# Patient Record
Sex: MALE | Race: WHITE | NOT HISPANIC OR LATINO | Employment: FULL TIME | URBAN - METROPOLITAN AREA
[De-identification: names, ages, dates, MRNs, and addresses within clinical notes are randomized per-mention and may not be internally consistent; named-entity substitution may affect disease eponyms.]

---

## 2017-12-01 ENCOUNTER — HISTORICAL (OUTPATIENT)
Dept: RADIOLOGY | Facility: HOSPITAL | Age: 37
End: 2017-12-01

## 2022-07-15 ENCOUNTER — HOSPITAL ENCOUNTER (OUTPATIENT)
Dept: RADIOLOGY | Facility: HOSPITAL | Age: 42
Discharge: HOME | End: 2022-07-15
Attending: INTERNAL MEDICINE
Payer: COMMERCIAL

## 2022-07-15 DIAGNOSIS — M25.572 LEFT ANKLE PAIN: ICD-10-CM

## 2022-07-15 DIAGNOSIS — M79.671 RIGHT FOOT PAIN: ICD-10-CM

## 2022-07-15 DIAGNOSIS — M79.672 LEFT FOOT PAIN: ICD-10-CM

## 2022-07-15 DIAGNOSIS — M25.571 RIGHT ANKLE PAIN: ICD-10-CM

## 2022-07-15 PROCEDURE — 73610 X-RAY EXAM OF ANKLE: CPT | Mod: TC,LT

## 2022-07-15 PROCEDURE — 73610 X-RAY EXAM OF ANKLE: CPT | Mod: TC,RT

## 2022-07-15 PROCEDURE — 73630 X-RAY EXAM OF FOOT: CPT | Mod: TC,LT

## 2022-07-15 PROCEDURE — 73630 X-RAY EXAM OF FOOT: CPT | Mod: TC,RT

## 2022-08-03 DIAGNOSIS — R10.9 STOMACH DISCOMFORT: ICD-10-CM

## 2022-08-03 DIAGNOSIS — R07.9 PAIN IN THE CHEST: Primary | ICD-10-CM

## 2022-08-05 ENCOUNTER — HOSPITAL ENCOUNTER (OUTPATIENT)
Dept: RADIOLOGY | Facility: HOSPITAL | Age: 42
Discharge: HOME | End: 2022-08-05
Attending: INTERNAL MEDICINE
Payer: COMMERCIAL

## 2022-08-05 DIAGNOSIS — R07.9 PAIN IN THE CHEST: ICD-10-CM

## 2022-08-05 DIAGNOSIS — R10.9 STOMACH DISCOMFORT: ICD-10-CM

## 2022-08-05 PROCEDURE — 71260 CT THORAX DX C+: CPT | Mod: TC

## 2022-08-05 PROCEDURE — 25500020 PHARM REV CODE 255: Performed by: INTERNAL MEDICINE

## 2022-08-05 PROCEDURE — 74170 CT ABD WO CNTRST FLWD CNTRST: CPT | Mod: TC

## 2022-08-05 PROCEDURE — 74177 CT ABD & PELVIS W/CONTRAST: CPT | Mod: TC

## 2022-08-05 RX ADMIN — DIATRIZOATE MEGLUMINE AND DIATRIZOATE SODIUM 30 ML: 660; 100 LIQUID ORAL; RECTAL at 08:08

## 2022-08-05 RX ADMIN — IOPAMIDOL 100 ML: 755 INJECTION, SOLUTION INTRAVENOUS at 08:08

## 2023-06-25 ENCOUNTER — HOSPITAL ENCOUNTER (EMERGENCY)
Facility: HOSPITAL | Age: 43
Discharge: HOME | End: 2023-06-25
Attending: STUDENT IN AN ORGANIZED HEALTH CARE EDUCATION/TRAINING PROGRAM
Payer: COMMERCIAL

## 2023-06-25 VITALS
DIASTOLIC BLOOD PRESSURE: 102 MMHG | TEMPERATURE: 97 F | HEART RATE: 54 BPM | SYSTOLIC BLOOD PRESSURE: 149 MMHG | HEIGHT: 71 IN | RESPIRATION RATE: 18 BRPM | BODY MASS INDEX: 33.6 KG/M2 | WEIGHT: 240 LBS | OXYGEN SATURATION: 97 %

## 2023-06-25 DIAGNOSIS — R42 DIZZINESS: ICD-10-CM

## 2023-06-25 DIAGNOSIS — R07.9 CHEST PAIN, UNSPECIFIED TYPE: Primary | ICD-10-CM

## 2023-06-25 DIAGNOSIS — R53.83 FATIGUE, UNSPECIFIED TYPE: ICD-10-CM

## 2023-06-25 DIAGNOSIS — R07.9 CHEST PAIN: ICD-10-CM

## 2023-06-25 LAB
ALBUMIN SERPL-MCNC: 4.2 G/DL (ref 3.5–5)
ALBUMIN/GLOB SERPL: 1.4 RATIO (ref 1.1–2)
ALP SERPL-CCNC: 63 UNIT/L (ref 40–150)
ALT SERPL-CCNC: 22 UNIT/L (ref 0–55)
AST SERPL-CCNC: 18 UNIT/L (ref 5–34)
BASOPHILS # BLD AUTO: 0.07 X10(3)/MCL
BASOPHILS NFR BLD AUTO: 0.9 %
BILIRUBIN DIRECT+TOT PNL SERPL-MCNC: 0.6 MG/DL
BUN SERPL-MCNC: 12.2 MG/DL (ref 8.9–20.6)
CALCIUM SERPL-MCNC: 9.8 MG/DL (ref 8.4–10.2)
CHLORIDE SERPL-SCNC: 106 MMOL/L (ref 98–107)
CO2 SERPL-SCNC: 25 MMOL/L (ref 22–29)
CREAT SERPL-MCNC: 1.2 MG/DL (ref 0.73–1.18)
D DIMER PPP IA.FEU-MCNC: <0.27 UG/ML FEU (ref 0–0.5)
EOSINOPHIL # BLD AUTO: 0.25 X10(3)/MCL (ref 0–0.9)
EOSINOPHIL NFR BLD AUTO: 3.1 %
ERYTHROCYTE [DISTWIDTH] IN BLOOD BY AUTOMATED COUNT: 13 % (ref 11.5–17)
GFR SERPLBLD CREATININE-BSD FMLA CKD-EPI: >60 MLS/MIN/1.73/M2
GLOBULIN SER-MCNC: 3.1 GM/DL (ref 2.4–3.5)
GLUCOSE SERPL-MCNC: 93 MG/DL (ref 74–100)
HCT VFR BLD AUTO: 47.4 % (ref 42–52)
HGB BLD-MCNC: 16.2 G/DL (ref 14–18)
IMM GRANULOCYTES # BLD AUTO: 0.01 X10(3)/MCL (ref 0–0.04)
IMM GRANULOCYTES NFR BLD AUTO: 0.1 %
LYMPHOCYTES # BLD AUTO: 2.09 X10(3)/MCL (ref 0.6–4.6)
LYMPHOCYTES NFR BLD AUTO: 25.6 %
MCH RBC QN AUTO: 27.8 PG (ref 27–31)
MCHC RBC AUTO-ENTMCNC: 34.2 G/DL (ref 33–36)
MCV RBC AUTO: 81.3 FL (ref 80–94)
MONOCYTES # BLD AUTO: 0.43 X10(3)/MCL (ref 0.1–1.3)
MONOCYTES NFR BLD AUTO: 5.3 %
NEUTROPHILS # BLD AUTO: 5.31 X10(3)/MCL (ref 2.1–9.2)
NEUTROPHILS NFR BLD AUTO: 65 %
NRBC BLD AUTO-RTO: 0 %
PLATELET # BLD AUTO: 334 X10(3)/MCL (ref 130–400)
PMV BLD AUTO: 9.2 FL (ref 7.4–10.4)
POCT GLUCOSE: 89 MG/DL (ref 70–110)
POTASSIUM SERPL-SCNC: 4.1 MMOL/L (ref 3.5–5.1)
PROT SERPL-MCNC: 7.3 GM/DL (ref 6.4–8.3)
RBC # BLD AUTO: 5.83 X10(6)/MCL (ref 4.7–6.1)
SODIUM SERPL-SCNC: 139 MMOL/L (ref 136–145)
TROPONIN I SERPL-MCNC: <0.01 NG/ML (ref 0–0.04)
TROPONIN I SERPL-MCNC: <0.01 NG/ML (ref 0–0.04)
WBC # SPEC AUTO: 8.16 X10(3)/MCL (ref 4.5–11.5)

## 2023-06-25 PROCEDURE — 93005 ELECTROCARDIOGRAM TRACING: CPT

## 2023-06-25 PROCEDURE — 63600175 PHARM REV CODE 636 W HCPCS: Performed by: STUDENT IN AN ORGANIZED HEALTH CARE EDUCATION/TRAINING PROGRAM

## 2023-06-25 PROCEDURE — 84484 ASSAY OF TROPONIN QUANT: CPT | Performed by: NURSE PRACTITIONER

## 2023-06-25 PROCEDURE — 96360 HYDRATION IV INFUSION INIT: CPT

## 2023-06-25 PROCEDURE — 82962 GLUCOSE BLOOD TEST: CPT

## 2023-06-25 PROCEDURE — 80053 COMPREHEN METABOLIC PANEL: CPT | Performed by: NURSE PRACTITIONER

## 2023-06-25 PROCEDURE — 99285 EMERGENCY DEPT VISIT HI MDM: CPT | Mod: 25

## 2023-06-25 PROCEDURE — 85379 FIBRIN DEGRADATION QUANT: CPT | Performed by: STUDENT IN AN ORGANIZED HEALTH CARE EDUCATION/TRAINING PROGRAM

## 2023-06-25 PROCEDURE — 84484 ASSAY OF TROPONIN QUANT: CPT | Performed by: STUDENT IN AN ORGANIZED HEALTH CARE EDUCATION/TRAINING PROGRAM

## 2023-06-25 PROCEDURE — 85025 COMPLETE CBC W/AUTO DIFF WBC: CPT | Performed by: NURSE PRACTITIONER

## 2023-06-25 RX ORDER — SODIUM CHLORIDE, SODIUM LACTATE, POTASSIUM CHLORIDE, CALCIUM CHLORIDE 600; 310; 30; 20 MG/100ML; MG/100ML; MG/100ML; MG/100ML
1000 INJECTION, SOLUTION INTRAVENOUS
Status: COMPLETED | OUTPATIENT
Start: 2023-06-25 | End: 2023-06-25

## 2023-06-25 RX ADMIN — SODIUM CHLORIDE, POTASSIUM CHLORIDE, SODIUM LACTATE AND CALCIUM CHLORIDE 1000 ML: 600; 310; 30; 20 INJECTION, SOLUTION INTRAVENOUS at 04:06

## 2023-06-25 NOTE — FIRST PROVIDER EVALUATION
Medical screening examination initiated.  I have conducted a focused provider triage encounter, findings are as follows:    Brief history of present illness:  43y/o M presents to the ED with chest pain with left arm discomfort/jaw pain  Onset 11am    There were no vitals filed for this visit.    Pertinent physical exam:  AAA x 3    Brief workup plan:  Labs, imaging,EKG    Preliminary workup initiated; this workup will be continued and followed by the physician or advanced practice provider that is assigned to the patient when roomed.

## 2023-06-25 NOTE — ED PROVIDER NOTES
Encounter Date: 6/25/2023    SCRIBE #1 NOTE: I, Valeriano Minor, am scribing for, and in the presence of,  Tod Chavira MD. I have scribed the following portions of the note - the EKG reading. Other sections scribed: HPI, ROS, PE.     History     Chief Complaint   Patient presents with    Chest Pain     L sided chest pain radiating to L arm and L jaw onset 1100 while sitting down. First episode lasted 5 minutes, current episode worsening 1200. + Dizziness. + L hand numbness. Took 4 baby asa @ 1100.      43 y/o male presents to the ED with left-sided pain chest that since improved since onset around 11AM today after getting up from the couch. Per pt's wife, he became dizzy and lightheaded and sat on the floor. The pain began to radiate to his left arm. Pt took 4 baby aspirin and soon complained of the pain radiating to his left jaw and left neck, as well as some left hand numbness. Wife also says pt had some left leg pain and tingling en route. Pt currently complains of fatigue, reports chronic pitting edema to bilateral legsand more frequent urinations and bowel movements. Is eating and drinking okay, in no chest discomfort currently. He does note that he had COVID-19 2 weeks ago.    The history is provided by the patient and the spouse. No  was used.   Review of patient's allergies indicates:  No Known Allergies  No past medical history on file.  No past surgical history on file.  No family history on file.     Review of Systems   Constitutional:  Positive for fatigue. Negative for appetite change.   HENT:          Jaw pain   Cardiovascular:  Positive for chest pain and leg swelling.   Genitourinary:  Positive for frequency.   Musculoskeletal:         Left arm pain   Neurological:  Positive for dizziness, light-headedness and numbness (Left hand).     Physical Exam     Initial Vitals [06/25/23 1426]   BP Pulse Resp Temp SpO2   (!) 123/94 64 18 97.2 °F (36.2 °C) 96 %      MAP       --          Physical Exam    Nursing note and vitals reviewed.  Constitutional: He appears well-developed and well-nourished. He is not diaphoretic. No distress.   HENT:   Head: Normocephalic and atraumatic.   Right Ear: External ear normal.   Left Ear: External ear normal.   Nose: Nose normal.   Eyes: EOM are normal. Pupils are equal, round, and reactive to light. Right eye exhibits no discharge. Left eye exhibits no discharge.   Cardiovascular:  Normal rate, regular rhythm and normal heart sounds.     Exam reveals no gallop and no friction rub.       No murmur heard.  Pulmonary/Chest: Effort normal and breath sounds normal. No respiratory distress. He has no wheezes. He has no rhonchi. He has no rales. He exhibits no tenderness.   Abdominal: Abdomen is soft. Bowel sounds are normal. He exhibits no distension and no mass. There is no abdominal tenderness. There is no rebound and no guarding.   Musculoskeletal:         General: Normal range of motion.      Comments: Questionable 1+ pitting edema to bilateral lower extremities     Neurological: He is alert and oriented to person, place, and time. No cranial nerve deficit or sensory deficit.   Skin: Skin is warm and dry. Capillary refill takes less than 2 seconds.       ED Course   Procedures  Labs Reviewed   COMPREHENSIVE METABOLIC PANEL - Abnormal; Notable for the following components:       Result Value    Creatinine 1.20 (*)     All other components within normal limits   TROPONIN I - Normal   D DIMER, QUANTITATIVE - Normal   TROPONIN I - Normal   CBC W/ AUTO DIFFERENTIAL    Narrative:     The following orders were created for panel order CBC Auto Differential.  Procedure                               Abnormality         Status                     ---------                               -----------         ------                     CBC with Differential[[id number]]                            Final result                 Please view results for these tests on the  individual orders.   CBC WITH DIFFERENTIAL   POCT GLUCOSE     EKG Readings: (Independently Interpreted)   Initial Reading: No STEMI. Rhythm: Normal Sinus Rhythm. Heart Rate: 64. ST Segments: Normal ST Segments. Axis: Normal.   Time: 14:24     ECG Results              EKG 12-lead (Final result)  Result time 06/25/23 16:22:10      Final result by Interface, Lab In [facility] (06/25/23 16:22:10)                   Narrative:    Test Reason : R07.9,    Vent. Rate : 064 BPM     Atrial Rate : 064 BPM     P-R Int : 128 ms          QRS Dur : 090 ms      QT Int : 384 ms       P-R-T Axes : 027 070 021 degrees     QTc Int : 396 ms    Normal sinus rhythm  Low voltage QRS  Borderline Abnormal ECG  No previous ECGs available  Confirmed by John Mcadams MD ([id number]) on 6/25/2023 4:22:02 PM    Referred By:             Confirmed By:John Mcadams MD                                  Imaging Results              CT Head Without Contrast (Final result)  Result time 06/25/23 18:53:03      Final result by Georgi Marcus MD (06/25/23 18:53:03)                   Impression:      No acute intracranial findings identified.      Electronically signed by: Georgi Marcus  Date:    06/25/2023  Time:    18:53               Narrative:    EXAMINATION:  CT HEAD WITHOUT CONTRAST    CLINICAL HISTORY:  Mental status change, unknown cause;    TECHNIQUE:  Sequential axial images were performed of the brain without contrast.    Dose product length of 1005 mGycm. Automated exposure control was utilized to minimize radiation dose.    COMPARISON:  December 1, 2017.    FINDINGS:  Gray-white matter differentiation is unremarkable.  There is no intracranial mass effect, midline shift, hydrocephalus or hemorrhage. There is no sulcal effacement or low attenuation changes to suggest recent large vessel territory infarction.  There is no acute extra axial fluid collection.  Left maxillary sinus small retention cyst.  Otherwise, visualized paranasal sinuses are  clear without mucosal thickening, polypoidal abnormality or air-fluid levels. Mastoid air cells aeration is optimal.                                       X-Ray Chest PA And Lateral (Final result)  Result time 06/25/23 14:36:55      Final result by Mick Koch MD (06/25/23 14:36:55)                   Impression:      1. No evidence lobar type consolidation or acute cardiac decompensation noted.      Electronically signed by: Mick Koch MD  Date:    06/25/2023  Time:    14:36               Narrative:    EXAMINATION:  XR CHEST PA AND LATERAL    CLINICAL HISTORY:  Chest pain.    TECHNIQUE:  2 views of the chest    COMPARISON:  None available.    FINDINGS:  The lungs demonstrate no evidence of lobar type consolidation, visible pneumothorax, or pleural fluid.   The cardiac silhouette is within normal limits for size.   No acute displaced fracture is seen.                                       Medications   lactated ringers infusion (0 mLs Intravenous Stopped 6/25/23 1700)              Scribe Attestation:   Scribe #1: I performed the above scribed service and the documentation accurately describes the services I performed. I attest to the accuracy of the note.    Attending Attestation:           Physician Attestation for Scribe:  Physician Attestation Statement for Scribe #1: I, Tod Chavira MD, reviewed documentation, as scribed by Valeriano Minor in my presence, and it is both accurate and complete.       Medical Decision Making  Patient presents with chest pain    The differential diagnosis includes, but is not limited to: Chest pain emergent diagnoses: ACS, PE, dissection, cardiac tamponade, tension pneumothorax.  Chest pain non-emergent diagnoses: Musculoskeletal, trauma, pleurisy, pneumonia, pleural effusion, GERD, other GI, neurologic, psychiatric and other non emergent diagnoses considered.    Patient was awake and alert.  His vitals are stable.  His workup was benign in the emergency department.  Troponin  undetectable, as is his D-dimer.  EKG sinus rhythm no acute findings.  Lab work is largely benign.  I am unsure why he is having symptoms.  Possibly related to recent COVID infection.  I believe that this time he is suitable for discharge home.  Patient and wife in room are comfortable with plan.  I did offer to set him up with Cardiology further evaluation however they declined.  They will follow up with PCP. Return precautions given.  Questions invited, questions answered to the best my ability.  Patient discharged home condition stable.            Amount and/or Complexity of Data Reviewed  External Data Reviewed: notes.     Details: See ED course  Labs: ordered. Decision-making details documented in ED Course.  Radiology: ordered. Decision-making details documented in ED Course.  ECG/medicine tests: ordered and independent interpretation performed. Decision-making details documented in ED Course.           ED Course as of 06/26/23 0134   Stefania Jun 25, 2023   1508 Chart review reveals patient with visit 08/05/2022 where he got a CT with contrast.  CT of the chest with contrast that time was unremarkable for any acute process as was CT of the abdomen pelvis same time. [MM]   1538 Troponin I: <0.010 [MM]   1632 Comprehensive metabolic panel(!)  No significant electrolyte or renal abnormality noted [MM]   1632 CBC Auto Differential  No anemia no leukocytosis [MM]   1632 POCT Glucose: 89 [MM]   1632 X-Ray Chest PA And Lateral  No acute process [MM]   1817 D-Dimer: <0.27 [MM]   1817 Troponin I: <0.010 [MM]   1857 CT Head Without Contrast  Negative for acute [MM]      ED Course User Index  [MM] Tod Chavira MD                 Clinical Impression:   Final diagnoses:  [R07.9] Chest pain  [R07.9] Chest pain, unspecified type (Primary)  [R53.83] Fatigue, unspecified type  [R42] Dizziness        ED Disposition Condition    Discharge Stable          ED Prescriptions    None       Follow-up Information       Follow up With  Specialties Details Why Contact Info    Jimi Hernandez MD Internal Medicine   [address]  [facility]  [facility] [address]  [phone]               Tod Chavira MD  06/26/23 0134

## 2023-06-26 NOTE — DISCHARGE INSTRUCTIONS

## 2024-04-03 ENCOUNTER — HOSPITAL ENCOUNTER (EMERGENCY)
Facility: HOSPITAL | Age: 44
Discharge: HOME | End: 2024-04-03
Attending: EMERGENCY MEDICINE
Payer: COMMERCIAL

## 2024-04-03 VITALS
HEIGHT: 70 IN | OXYGEN SATURATION: 100 % | HEART RATE: 89 BPM | DIASTOLIC BLOOD PRESSURE: 81 MMHG | RESPIRATION RATE: 18 BRPM | BODY MASS INDEX: 32.07 KG/M2 | WEIGHT: 224 LBS | TEMPERATURE: 99 F | SYSTOLIC BLOOD PRESSURE: 122 MMHG

## 2024-04-03 DIAGNOSIS — B34.9 VIRAL SYNDROME: Primary | ICD-10-CM

## 2024-04-03 DIAGNOSIS — R10.13 EPIGASTRIC ABDOMINAL PAIN: ICD-10-CM

## 2024-04-03 LAB
ALBUMIN SERPL-MCNC: 3.9 G/DL (ref 3.5–5)
ALBUMIN/GLOB SERPL: 1.1 RATIO (ref 1.1–2)
ALP SERPL-CCNC: 63 UNIT/L (ref 40–150)
ALT SERPL-CCNC: 27 UNIT/L (ref 0–55)
APPEARANCE UR: CLEAR
AST SERPL-CCNC: 21 UNIT/L (ref 5–34)
BACTERIA #/AREA URNS AUTO: NORMAL /HPF
BASOPHILS # BLD AUTO: 0.04 X10(3)/MCL
BASOPHILS NFR BLD AUTO: 0.4 %
BILIRUB SERPL-MCNC: 0.5 MG/DL
BILIRUB UR QL STRIP.AUTO: NEGATIVE
BNP BLD-MCNC: <10 PG/ML
BUN SERPL-MCNC: 10.9 MG/DL (ref 8.9–20.6)
CALCIUM SERPL-MCNC: 9.8 MG/DL (ref 8.4–10.2)
CHLORIDE SERPL-SCNC: 103 MMOL/L (ref 98–107)
CO2 SERPL-SCNC: 29 MMOL/L (ref 22–29)
COLOR UR AUTO: COLORLESS
CREAT SERPL-MCNC: 1.08 MG/DL (ref 0.73–1.18)
EOSINOPHIL # BLD AUTO: 0.04 X10(3)/MCL (ref 0–0.9)
EOSINOPHIL NFR BLD AUTO: 0.4 %
ERYTHROCYTE [DISTWIDTH] IN BLOOD BY AUTOMATED COUNT: 12.9 % (ref 11.5–17)
FLUAV AG UPPER RESP QL IA.RAPID: NOT DETECTED
FLUBV AG UPPER RESP QL IA.RAPID: NOT DETECTED
GFR SERPLBLD CREATININE-BSD FMLA CKD-EPI: >60 MLS/MIN/1.73/M2
GLOBULIN SER-MCNC: 3.4 GM/DL (ref 2.4–3.5)
GLUCOSE SERPL-MCNC: 96 MG/DL (ref 74–100)
GLUCOSE UR QL STRIP.AUTO: NORMAL
HCT VFR BLD AUTO: 51.3 % (ref 42–52)
HGB BLD-MCNC: 17.5 G/DL (ref 14–18)
IMM GRANULOCYTES # BLD AUTO: 0.03 X10(3)/MCL (ref 0–0.04)
IMM GRANULOCYTES NFR BLD AUTO: 0.3 %
KETONES UR QL STRIP.AUTO: NEGATIVE
LEUKOCYTE ESTERASE UR QL STRIP.AUTO: NEGATIVE
LIPASE SERPL-CCNC: 19 U/L
LYMPHOCYTES # BLD AUTO: 1.42 X10(3)/MCL (ref 0.6–4.6)
LYMPHOCYTES NFR BLD AUTO: 14.9 %
MCH RBC QN AUTO: 28.1 PG (ref 27–31)
MCHC RBC AUTO-ENTMCNC: 34.1 G/DL (ref 33–36)
MCV RBC AUTO: 82.5 FL (ref 80–94)
MONOCYTES # BLD AUTO: 0.89 X10(3)/MCL (ref 0.1–1.3)
MONOCYTES NFR BLD AUTO: 9.3 %
NEUTROPHILS # BLD AUTO: 7.13 X10(3)/MCL (ref 2.1–9.2)
NEUTROPHILS NFR BLD AUTO: 74.7 %
NITRITE UR QL STRIP.AUTO: NEGATIVE
NRBC BLD AUTO-RTO: 0 %
PH UR STRIP.AUTO: 6 [PH]
PLATELET # BLD AUTO: 287 X10(3)/MCL (ref 130–400)
PMV BLD AUTO: 9.3 FL (ref 7.4–10.4)
POTASSIUM SERPL-SCNC: 4.3 MMOL/L (ref 3.5–5.1)
PROT SERPL-MCNC: 7.3 GM/DL (ref 6.4–8.3)
PROT UR QL STRIP.AUTO: NEGATIVE
RBC # BLD AUTO: 6.22 X10(6)/MCL (ref 4.7–6.1)
RBC #/AREA URNS AUTO: NORMAL /HPF
RBC UR QL AUTO: NEGATIVE
SARS-COV-2 RNA RESP QL NAA+PROBE: NOT DETECTED
SODIUM SERPL-SCNC: 139 MMOL/L (ref 136–145)
SP GR UR STRIP.AUTO: 1 (ref 1–1.03)
SQUAMOUS #/AREA URNS LPF: NORMAL /HPF
TROPONIN I SERPL-MCNC: <0.01 NG/ML (ref 0–0.04)
UROBILINOGEN UR STRIP-ACNC: NORMAL
WBC # SPEC AUTO: 9.55 X10(3)/MCL (ref 4.5–11.5)
WBC #/AREA URNS AUTO: NORMAL /HPF

## 2024-04-03 PROCEDURE — 81001 URINALYSIS AUTO W/SCOPE: CPT | Performed by: PHYSICIAN ASSISTANT

## 2024-04-03 PROCEDURE — 93005 ELECTROCARDIOGRAM TRACING: CPT

## 2024-04-03 PROCEDURE — 25500020 PHARM REV CODE 255

## 2024-04-03 PROCEDURE — 0240U COVID/FLU A&B PCR: CPT | Performed by: PHYSICIAN ASSISTANT

## 2024-04-03 PROCEDURE — 85025 COMPLETE CBC W/AUTO DIFF WBC: CPT | Performed by: PHYSICIAN ASSISTANT

## 2024-04-03 PROCEDURE — 83880 ASSAY OF NATRIURETIC PEPTIDE: CPT | Performed by: PHYSICIAN ASSISTANT

## 2024-04-03 PROCEDURE — 63600175 PHARM REV CODE 636 W HCPCS

## 2024-04-03 PROCEDURE — 25000003 PHARM REV CODE 250

## 2024-04-03 PROCEDURE — 84484 ASSAY OF TROPONIN QUANT: CPT | Performed by: PHYSICIAN ASSISTANT

## 2024-04-03 PROCEDURE — 96374 THER/PROPH/DIAG INJ IV PUSH: CPT

## 2024-04-03 PROCEDURE — 80053 COMPREHEN METABOLIC PANEL: CPT | Performed by: PHYSICIAN ASSISTANT

## 2024-04-03 PROCEDURE — 93010 ELECTROCARDIOGRAM REPORT: CPT | Mod: ,,, | Performed by: INTERNAL MEDICINE

## 2024-04-03 PROCEDURE — 83690 ASSAY OF LIPASE: CPT | Performed by: PHYSICIAN ASSISTANT

## 2024-04-03 PROCEDURE — 99285 EMERGENCY DEPT VISIT HI MDM: CPT | Mod: 25

## 2024-04-03 RX ORDER — KETOROLAC TROMETHAMINE 10 MG/1
10 TABLET, FILM COATED ORAL EVERY 6 HOURS
Qty: 20 TABLET | Refills: 0 | Status: SHIPPED | OUTPATIENT
Start: 2024-04-03 | End: 2024-04-08

## 2024-04-03 RX ORDER — DICYCLOMINE HYDROCHLORIDE 20 MG/1
20 TABLET ORAL 2 TIMES DAILY
Qty: 30 TABLET | Refills: 0 | Status: SHIPPED | OUTPATIENT
Start: 2024-04-03 | End: 2024-04-18

## 2024-04-03 RX ORDER — ONDANSETRON 4 MG/1
4 TABLET, ORALLY DISINTEGRATING ORAL EVERY 6 HOURS PRN
Qty: 45 TABLET | Refills: 0 | OUTPATIENT
Start: 2024-04-03 | End: 2024-04-03

## 2024-04-03 RX ORDER — BUTALBITAL, ACETAMINOPHEN AND CAFFEINE 50; 325; 40 MG/1; MG/1; MG/1
1 TABLET ORAL EVERY 6 HOURS PRN
Qty: 45 TABLET | Refills: 0 | OUTPATIENT
Start: 2024-04-03 | End: 2024-04-03

## 2024-04-03 RX ORDER — KETOROLAC TROMETHAMINE 30 MG/ML
30 INJECTION, SOLUTION INTRAMUSCULAR; INTRAVENOUS
Status: COMPLETED | OUTPATIENT
Start: 2024-04-03 | End: 2024-04-03

## 2024-04-03 RX ORDER — BUTALBITAL, ACETAMINOPHEN AND CAFFEINE 50; 325; 40 MG/1; MG/1; MG/1
1 TABLET ORAL EVERY 6 HOURS PRN
Qty: 45 TABLET | Refills: 0 | Status: SHIPPED | OUTPATIENT
Start: 2024-04-03 | End: 2024-05-03

## 2024-04-03 RX ORDER — KETOROLAC TROMETHAMINE 30 MG/ML
30 INJECTION, SOLUTION INTRAMUSCULAR; INTRAVENOUS
Status: DISCONTINUED | OUTPATIENT
Start: 2024-04-03 | End: 2024-04-03

## 2024-04-03 RX ORDER — DICYCLOMINE HYDROCHLORIDE 20 MG/1
20 TABLET ORAL 2 TIMES DAILY
Qty: 30 TABLET | Refills: 0 | OUTPATIENT
Start: 2024-04-03 | End: 2024-04-03

## 2024-04-03 RX ORDER — BUTALBITAL, ACETAMINOPHEN AND CAFFEINE 50; 325; 40 MG/1; MG/1; MG/1
1 TABLET ORAL
Status: COMPLETED | OUTPATIENT
Start: 2024-04-03 | End: 2024-04-03

## 2024-04-03 RX ORDER — ONDANSETRON 4 MG/1
4 TABLET, ORALLY DISINTEGRATING ORAL EVERY 6 HOURS PRN
Qty: 45 TABLET | Refills: 0 | Status: SHIPPED | OUTPATIENT
Start: 2024-04-03

## 2024-04-03 RX ADMIN — KETOROLAC TROMETHAMINE 30 MG: 30 INJECTION, SOLUTION INTRAMUSCULAR at 05:04

## 2024-04-03 RX ADMIN — BUTALBITAL, ACETAMINOPHEN, AND CAFFEINE 1 TABLET: 50; 325; 40 TABLET ORAL at 05:04

## 2024-04-03 RX ADMIN — IOHEXOL 100 ML: 350 INJECTION, SOLUTION INTRAVENOUS at 05:04

## 2024-04-03 NOTE — FIRST PROVIDER EVALUATION
"Medical screening examination initiated.  I have conducted a focused provider triage encounter, findings are as follows:    Chief Complaint   Patient presents with    Fever     Generalized abd pain, diarrhea, nonproductive cough, and a "fever" of "99.6". Denies PMH. Was seen on Sunday for "sinus problems" and started on a Z-carmelina.     Brief history of present illness:  43 y.o. male presents to the ED with epigastric abdominal pain, nausea, and chills/subjective fever onset Sunday. Notes he woke up this morning with low back pain as well as chest pain with deep inspiration. Patient states he just finished a Z-carmelina on Sunday for sinus infection prior to the onset of these symptoms. Denies vomiting. No tylenol/motrin this morning     There were no vitals filed for this visit.    Pertinent physical exam:  Awake, alert, ambulatory, non-labored respirations    Brief workup plan:  labs, UA, EKG    Preliminary workup initiated; this workup will be continued and followed by the physician or advanced practice provider that is assigned to the patient when roomed.  "

## 2024-04-03 NOTE — DISCHARGE INSTRUCTIONS
For stomach pain, take Bentyl 2 times daily.  Additionally you may take Toradol as needed.  Continue taking your current medications.      For nausea, use Zofran as needed.  Recommend that you increase fluid any regular meals as tolerated.      For headaches, Toradol we will help you may also rotate this medication with Fioricet or over-the-counter Tylenol.      Return to ER for worsening symptoms.

## 2024-04-03 NOTE — ED PROVIDER NOTES
"Encounter Date: 4/3/2024       History     Chief Complaint   Patient presents with    Fever     Generalized abd pain that states is constantly a "4" and worsens after meals, diarrhea, nonproductive cough, and a "fever" of "99.6". Denies PMH. Was seen on Sunday for "sinus problems" and started on a Z-carmelina. has been on a Z-carmelina several times since October after being placed on CPAP machine d/t reoccurring sinus infections.      See [facility] for details     The history is provided by the patient.     Review of patient's allergies indicates:   Allergen Reactions    Nuts [tree nut] Anaphylaxis     No past medical history on file.  No past surgical history on file.  No family history on file.     Review of Systems   Constitutional:  Positive for fever. Negative for chills.   HENT:  Positive for congestion.    Respiratory:  Negative for shortness of breath.    Cardiovascular:  Negative for chest pain.   Gastrointestinal:  Positive for abdominal pain and nausea. Negative for vomiting.   All other systems reviewed and are negative.      Physical Exam     Initial Vitals [04/03/24 1222]   BP Pulse Resp Temp SpO2   131/68 99 20 99.4 °F (37.4 °C) 99 %      MAP       --         Physical Exam    Nursing note and vitals reviewed.  Constitutional: He appears well-developed and well-nourished. No distress.   HENT:   Head: Normocephalic and atraumatic.   Eyes: Conjunctivae and EOM are normal.   Neck:   Normal range of motion.  Cardiovascular:  Normal rate, regular rhythm and normal heart sounds.           Pulmonary/Chest: Breath sounds normal. No respiratory distress. He has no wheezes. He exhibits tenderness.   Abdominal: Abdomen is soft.   Tender to palpation to diffuse abdomen. There is no rebound and no guarding.   Musculoskeletal:         General: Normal range of motion.      Cervical back: Normal range of motion.     Neurological: He is alert and oriented to person, place, and time. GCS score is 15. GCS eye subscore is 4. GCS " verbal subscore is 5. GCS motor subscore is 6.   Skin: Skin is warm and dry.   Psychiatric: He has a normal mood and affect. Thought content normal.         ED Course   Procedures  Labs Reviewed   CBC WITH DIFFERENTIAL - Abnormal; Notable for the following components:       Result Value    RBC 6.22 (*)     All other components within normal limits   LIPASE - Normal   TROPONIN I - Normal   URINALYSIS, REFLEX TO URINE CULTURE - Normal   COVID/FLU A&B PCR - Normal    Narrative:     The Xpert Xpress SARS-CoV-2/FLU/RSV plus is a rapid, multiplexed real-time PCR test intended for the simultaneous qualitative detection and differentiation of SARS-CoV-2, Influenza A, Influenza B, and respiratory syncytial virus (RSV) viral RNA in either nasopharyngeal swab or nasal swab specimens.         B-TYPE NATRIURETIC PEPTIDE - Normal   CBC W/ AUTO DIFFERENTIAL    Narrative:     The following orders were created for panel order CBC auto differential.  Procedure                               Abnormality         Status                     ---------                               -----------         ------                     CBC with Differential[[id number]]       Abnormal            Final result                 Please view results for these tests on the individual orders.   COMPREHENSIVE METABOLIC PANEL          Imaging Results              CT Abdomen Pelvis With IV Contrast NO Oral Contrast (Final result)  Result time 04/03/24 17:43:42      Final result by Belkis Arriaza MD (04/03/24 17:43:42)                   Impression:      1. Fluid-filled bowel loops are nonspecific but can be seen with enteritis.      Electronically signed by: Belkis Arriaza  Date:    04/03/2024  Time:    17:43               Narrative:    EXAMINATION:  CT ABDOMEN PELVIS WITH IV CONTRAST    CLINICAL HISTORY:  Abdominal pain, acute, nonlocalized;    TECHNIQUE:  Helically acquired images with axial, sagittal and coronal reformations were obtained from the  lung bases to the pubic symphysis after the IV administration of contrast.    Automated tube current modulation, weight-based exposure dosing, and/or iterative reconstruction technique utilized to reach lowest reasonably achievable exposure rate.    DLP: 933 mGy*cm    COMPARISON:  Abdominal sonogram 04/03/2024    FINDINGS:  HEART: Normal in size. No pericardial effusion.    LUNG BASES: Well aerated.    LIVER: Normal attenuation. No appreciable focal hepatic lesion.    BILIARY: No calcified gallstones.    PANCREAS: No inflammatory change.    SPLEEN: Normal in size    ADRENALS: No mass.    KIDNEYS/URETERS: The kidneys enhance symmetrically.  No hydronephrosis.    GI TRACT/MESENTERY: Small sliding hiatal hernia. Fluid-filled small bowel loops without evidence of obstruction.  The appendix is normal.    PERITONEUM: No free fluid.No free air.    LYMPH NODES: No enlarged lymph nodes by size criteria.    VASCULATURE: No significant atherosclerosis or aneurysm.    BLADDER: Normal appearance given degree of distention.    REPRODUCTIVE ORGANS: Normal as visualized.    SOFT TISSUES: Unremarkable.    BONES: No acute osseous abnormality.                                       US Abdomen Limited (Final result)  Result time 04/03/24 13:43:54      Final result by Belkis Arriaza MD (04/03/24 13:43:54)                   Impression:      No significant abnormality.      Electronically signed by: Belkis Arriaza  Date:    04/03/2024  Time:    13:43               Narrative:    EXAMINATION:  US ABDOMEN LIMITED    CLINICAL HISTORY:  RUQ pain;    TECHNIQUE:  Limited ultrasound of the right upper quadrant of the abdomen was performed.    COMPARISON:  CT abdomen pelvis 08/05/2022    FINDINGS:  LIVER: 13 cm cranial caudal at the midclavicular line. Normal echotexture. No focal mass appreciable. Portal vein is patent with appropriate directional flow.    PANCREAS: Head of the pancreas appears normal. Tail is obscured by bowel  gas.    GALLBLADDER: No shadowing calculi, wall thickening, or pericholecystic fluid.  Negative sonographic Mishra sign.    BILE DUCTS: 3 mm common bile duct.  Distal common bile duct is obscured by shadowing bowel gas.    INFERIOR VENA CAVA: Normal in appearance.    RIGHT KIDNEY: 9.9 cm. No hydronephrosis.    OTHER: No ascites.                                       Medications   ketorolac injection 30 mg (30 mg Intravenous Given 4/3/24 1722)   iohexoL (OMNIPAQUE 350) injection 100 mL (100 mLs Intravenous Given 4/3/24 1740)   butalbital-acetaminophen-caffeine -40 mg per tablet 1 tablet (1 tablet Oral Given 4/3/24 1757)     Medical Decision Making  43-year-old male presents to the ER for evaluation of generalized abdominal pain x4 days.  Patient reports that over the last 4 days he has been having worsening Diffuse abdominal pain.  He reports worsening pain following eating.  He reports his pain is constant, however, he does have episodes of intense pain.  He denies any relieving factors to his pain.  He reports that he has been taking Prilosec at home without relief.  Patient reports that he has had some nausea without vomiting.  He denies taking any medications for this at home.  He reports that he is continued to tolerate liquid and solid foods.  The patient is also reports a low-grade fever over the last 4-5 days.  He reports that T-max was 99.6.  Patient reports that he has been having an ongoing issue with chronic sinusitis/congestion since October of 2023.  He has been seeing his primary care provider who has been giving him Z-David eats every few weeks.  He shares that his congestion and sinusitis symptoms will improve, however, they always return.  He reports that he is been using a CPAP recently which does not seem to be helping.  He also reports some pain with taking a deep breath.  The patient is also complaining of a diffuse headache.  He reports that the headache is sensitive to light and sound.  He  denies any vision changes.  Patient denies any known sick contacts.    Lab work today was unremarkable.  Ultrasound of the right upper quadrant was ordered which showed no abnormalities.  Discussed this with the patient and he verbalizes understanding.  On physical exam, the patient did have diffuse tenderness which prompted a CT scan.  The CT scan showed possible enteritis.  Discussed this with the patient and he verbalizes understanding.  Suspect that all of his symptoms are secondary to some inflammatory response.  IM Toradol was given in ED without relief of headache pain.  He was then given Fioricet which did help some.  Recommend that he continues to see his primary care provider for continued management of his chronic congestion.  He does appear to have some costochondritis today for which anti-inflammatories we will help.  Discussed all this with the patient he verbalizes understanding.  We will discharge home with Toradol, Zofran, Bentyl, Zofran for symptomatic relief.  Patient was in agreement with treatment plan and was discharged home.    Amount and/or Complexity of Data Reviewed  Labs:  Decision-making details documented in ED Course.  Radiology: ordered.    Risk  Prescription drug management.      Additional MDM:   Differential Diagnosis:   Other: The following diagnoses were also considered and will be evaluated: MI, costochondritis and Cholecystitis.            ED Course as of 04/03/24 2122 Wed Apr 03, 2024   1641 CBC grossly unremarkable [LM]   1647 CMP unremarkable [LM]   1648 UA negative [LM]   1648 BNP: <10.0 [LM]   1648 Troponin I: <0.010 [LM]   1648 Lipase: 19 [LM]   1648 COVID, flu negative [LM]   1648 Ultrasound Limited:Impression:     No significant abnormality.      [LM]   1746 CT Abd/ Pelvis: Impression:     1. Fluid-filled bowel loops are nonspecific but can be seen with enteritis.        Electronically signed by: Belkis Arriaza   [LM]      ED Course User Index  [LM] Yris Maier PA                            Clinical Impression:  Final diagnoses:  [R10.13] Epigastric abdominal pain  [B34.9] Viral syndrome (Primary)          ED Disposition Condition    Discharge Stable          ED Prescriptions       Medication Sig Dispense Start Date End Date Auth. Provider    butalbital-acetaminophen-caffeine -40 mg (FIORICET, ESGIC) -40 mg per tablet  (Status: Discontinued) Take 1 tablet by mouth every 6 (six) hours as needed for Headaches. 45 tablet 4/3/2024 4/3/2024 Yris Maier PA    ondansetron (ZOFRAN-ODT) 4 MG TbDL  (Status: Discontinued) Take 1 tablet (4 mg total) by mouth every 6 (six) hours as needed (nausea). 45 tablet 4/3/2024 4/3/2024 Yris Maier PA    dicyclomine (BENTYL) 20 mg tablet  (Status: Discontinued) Take 1 tablet (20 mg total) by mouth 2 (two) times daily. for 15 days 30 tablet 4/3/2024 4/3/2024 Yris Maier PA    butalbital-acetaminophen-caffeine -40 mg (FIORICET, ESGIC) -40 mg per tablet Take 1 tablet by mouth every 6 (six) hours as needed for Headaches. 45 tablet 4/3/2024 5/3/2024 Yris Maier PA    dicyclomine (BENTYL) 20 mg tablet Take 1 tablet (20 mg total) by mouth 2 (two) times daily. for 15 days 30 tablet 4/3/2024 4/18/2024 Yris Maier PA    ondansetron (ZOFRAN-ODT) 4 MG TbDL Take 1 tablet (4 mg total) by mouth every 6 (six) hours as needed (nausea). 45 tablet 4/3/2024 -- Yris Maier PA    ketorolac (TORADOL) 10 mg tablet Take 1 tablet (10 mg total) by mouth every 6 (six) hours. for 5 days 20 tablet 4/3/2024 4/8/2024 Yris Maier PA          Follow-up Information       Follow up With Specialties Details Why Contact Info    Jimi Hernandez MD Internal Medicine Schedule an appointment as soon as possible for a visit in 1 day for continued management of chronic congestion [address] [place] [address] [facility] [address]  [phone]               Yris Maier PA  04/03/24 7457

## 2024-04-04 LAB
OHS QRS DURATION: 90 MS
OHS QTC CALCULATION: 428 MS

## 2025-07-12 ENCOUNTER — HOSPITAL ENCOUNTER (EMERGENCY)
Facility: HOSPITAL | Age: 45
Discharge: HOME | End: 2025-07-12
Attending: EMERGENCY MEDICINE
Payer: COMMERCIAL

## 2025-07-12 VITALS
HEIGHT: 70 IN | SYSTOLIC BLOOD PRESSURE: 139 MMHG | RESPIRATION RATE: 15 BRPM | HEART RATE: 63 BPM | OXYGEN SATURATION: 96 % | WEIGHT: 220 LBS | TEMPERATURE: 98 F | BODY MASS INDEX: 31.5 KG/M2 | DIASTOLIC BLOOD PRESSURE: 89 MMHG

## 2025-07-12 DIAGNOSIS — R10.31 RIGHT GROIN PAIN: ICD-10-CM

## 2025-07-12 DIAGNOSIS — N50.811 PAIN IN RIGHT TESTICLE: ICD-10-CM

## 2025-07-12 DIAGNOSIS — R10.9 RIGHT SIDED ABDOMINAL PAIN: Primary | ICD-10-CM

## 2025-07-12 LAB
ALBUMIN SERPL-MCNC: 4.4 G/DL (ref 3.5–5)
ALBUMIN/GLOB SERPL: 1.5 RATIO (ref 1.1–2)
ALP SERPL-CCNC: 58 UNIT/L (ref 40–150)
ALT SERPL-CCNC: 20 UNIT/L (ref 0–55)
ANION GAP SERPL CALC-SCNC: 8 MEQ/L
AST SERPL-CCNC: 16 UNIT/L (ref 11–45)
BACTERIA #/AREA URNS AUTO: NORMAL /HPF
BASOPHILS # BLD AUTO: 0.07 X10(3)/MCL
BASOPHILS NFR BLD AUTO: 0.9 %
BILIRUB SERPL-MCNC: 1 MG/DL
BILIRUB UR QL STRIP.AUTO: NEGATIVE
BUN SERPL-MCNC: 11.7 MG/DL (ref 8.9–20.6)
CALCIUM SERPL-MCNC: 9.7 MG/DL (ref 8.4–10.2)
CHLORIDE SERPL-SCNC: 105 MMOL/L (ref 98–107)
CLARITY UR: CLEAR
CO2 SERPL-SCNC: 28 MMOL/L (ref 22–29)
COLOR UR AUTO: NORMAL
CREAT SERPL-MCNC: 1.06 MG/DL (ref 0.72–1.25)
CREAT/UREA NIT SERPL: 11
EOSINOPHIL # BLD AUTO: 0.12 X10(3)/MCL (ref 0–0.9)
EOSINOPHIL NFR BLD AUTO: 1.6 %
ERYTHROCYTE [DISTWIDTH] IN BLOOD BY AUTOMATED COUNT: 13 % (ref 11.5–17)
FLUAV AG UPPER RESP QL IA.RAPID: NOT DETECTED
FLUBV AG UPPER RESP QL IA.RAPID: NOT DETECTED
GFR SERPLBLD CREATININE-BSD FMLA CKD-EPI: >60 ML/MIN/1.73/M2
GLOBULIN SER-MCNC: 2.9 GM/DL (ref 2.4–3.5)
GLUCOSE SERPL-MCNC: 68 MG/DL (ref 74–100)
GLUCOSE UR QL STRIP: NEGATIVE
HCT VFR BLD AUTO: 48.8 % (ref 42–52)
HGB BLD-MCNC: 16.2 G/DL (ref 14–18)
HGB UR QL STRIP: NEGATIVE
IMM GRANULOCYTES # BLD AUTO: 0.01 X10(3)/MCL (ref 0–0.04)
IMM GRANULOCYTES NFR BLD AUTO: 0.1 %
KETONES UR QL STRIP: NEGATIVE
LEUKOCYTE ESTERASE UR QL STRIP: NEGATIVE
LYMPHOCYTES # BLD AUTO: 2.16 X10(3)/MCL (ref 0.6–4.6)
LYMPHOCYTES NFR BLD AUTO: 29.1 %
MAGNESIUM SERPL-MCNC: 2.3 MG/DL (ref 1.6–2.6)
MCH RBC QN AUTO: 27.8 PG (ref 27–31)
MCHC RBC AUTO-ENTMCNC: 33.2 G/DL (ref 33–36)
MCV RBC AUTO: 83.7 FL (ref 80–94)
MONOCYTES # BLD AUTO: 0.48 X10(3)/MCL (ref 0.1–1.3)
MONOCYTES NFR BLD AUTO: 6.5 %
NEUTROPHILS # BLD AUTO: 4.57 X10(3)/MCL (ref 2.1–9.2)
NEUTROPHILS NFR BLD AUTO: 61.8 %
NITRITE UR QL STRIP: NEGATIVE
NRBC BLD AUTO-RTO: 0 %
PH UR STRIP: 6 [PH]
PLATELET # BLD AUTO: 284 X10(3)/MCL (ref 130–400)
PMV BLD AUTO: 9.5 FL (ref 7.4–10.4)
POTASSIUM SERPL-SCNC: 4.1 MMOL/L (ref 3.5–5.1)
PROT SERPL-MCNC: 7.3 GM/DL (ref 6.4–8.3)
PROT UR QL STRIP: NEGATIVE
RBC # BLD AUTO: 5.83 X10(6)/MCL (ref 4.7–6.1)
RBC #/AREA URNS AUTO: NORMAL /HPF
RSV A 5' UTR RNA NPH QL NAA+PROBE: NOT DETECTED
SARS-COV-2 RNA RESP QL NAA+PROBE: NOT DETECTED
SODIUM SERPL-SCNC: 141 MMOL/L (ref 136–145)
SP GR UR STRIP.AUTO: <=1.005 (ref 1–1.03)
SQUAMOUS #/AREA URNS LPF: NORMAL /HPF
UROBILINOGEN UR STRIP-ACNC: 0.2
WBC # BLD AUTO: 7.41 X10(3)/MCL (ref 4.5–11.5)
WBC #/AREA URNS AUTO: NORMAL /HPF

## 2025-07-12 PROCEDURE — 25000003 PHARM REV CODE 250: Performed by: PHYSICIAN ASSISTANT

## 2025-07-12 PROCEDURE — 81001 URINALYSIS AUTO W/SCOPE: CPT | Performed by: PHYSICIAN ASSISTANT

## 2025-07-12 PROCEDURE — 83735 ASSAY OF MAGNESIUM: CPT | Performed by: PHYSICIAN ASSISTANT

## 2025-07-12 PROCEDURE — 63600175 PHARM REV CODE 636 W HCPCS: Performed by: PHYSICIAN ASSISTANT

## 2025-07-12 PROCEDURE — 96375 TX/PRO/DX INJ NEW DRUG ADDON: CPT

## 2025-07-12 PROCEDURE — 80053 COMPREHEN METABOLIC PANEL: CPT | Performed by: PHYSICIAN ASSISTANT

## 2025-07-12 PROCEDURE — 96374 THER/PROPH/DIAG INJ IV PUSH: CPT

## 2025-07-12 PROCEDURE — 96361 HYDRATE IV INFUSION ADD-ON: CPT

## 2025-07-12 PROCEDURE — 99285 EMERGENCY DEPT VISIT HI MDM: CPT | Mod: 25

## 2025-07-12 PROCEDURE — 87637 SARSCOV2&INF A&B&RSV AMP PRB: CPT | Performed by: PHYSICIAN ASSISTANT

## 2025-07-12 PROCEDURE — 85025 COMPLETE CBC W/AUTO DIFF WBC: CPT | Performed by: PHYSICIAN ASSISTANT

## 2025-07-12 RX ORDER — MORPHINE SULFATE 4 MG/ML
4 INJECTION, SOLUTION INTRAMUSCULAR; INTRAVENOUS
Refills: 0 | Status: COMPLETED | OUTPATIENT
Start: 2025-07-12 | End: 2025-07-12

## 2025-07-12 RX ORDER — HYDROCODONE BITARTRATE AND ACETAMINOPHEN 5; 325 MG/1; MG/1
1 TABLET ORAL EVERY 8 HOURS PRN
Qty: 9 TABLET | Refills: 0 | Status: SHIPPED | OUTPATIENT
Start: 2025-07-12 | End: 2025-07-15

## 2025-07-12 RX ORDER — ONDANSETRON HYDROCHLORIDE 2 MG/ML
4 INJECTION, SOLUTION INTRAVENOUS
Status: COMPLETED | OUTPATIENT
Start: 2025-07-12 | End: 2025-07-12

## 2025-07-12 RX ORDER — IBUPROFEN 800 MG/1
800 TABLET, FILM COATED ORAL 3 TIMES DAILY PRN
Qty: 21 TABLET | Refills: 0 | Status: SHIPPED | OUTPATIENT
Start: 2025-07-12 | End: 2025-07-19

## 2025-07-12 RX ADMIN — SODIUM CHLORIDE 1000 ML: 9 INJECTION, SOLUTION INTRAVENOUS at 06:07

## 2025-07-12 RX ADMIN — ONDANSETRON 4 MG: 2 INJECTION INTRAMUSCULAR; INTRAVENOUS at 06:07

## 2025-07-12 RX ADMIN — MORPHINE SULFATE 4 MG: 4 INJECTION INTRAVENOUS at 06:07

## 2025-07-12 NOTE — FIRST PROVIDER EVALUATION
Medical screening examination initiated.  I have conducted a focused provider triage encounter, findings are as follows:    Brief history of present illness:  44yoWM presents to the ER with RLQ pain that radiates to low back and into testicles x 4 days. Hx of testicular torsion. No vomiting.     There were no vitals filed for this visit.    Pertinent physical exam:  looks uncomfortable.     Brief workup plan:  labs and imaging.     Preliminary workup initiated; this workup will be continued and followed by the physician or advanced practice provider that is assigned to the patient when roomed.

## 2025-07-13 NOTE — DISCHARGE INSTRUCTIONS
Hydrate. Ibuprofen for pain/inflammation, take with food. If pain is severe may take norco for more severe pain, do not take and drive. Follow up with primary care and/or urology.

## 2025-07-13 NOTE — ED PROVIDER NOTES
Encounter Date: 7/12/2025       History     Chief Complaint   Patient presents with    Abdominal Pain     Abd pain extending to flank and groin, started wed this week     See MDM    The history is provided by the patient. No  was used.     Review of patient's allergies indicates:   Allergen Reactions    Nuts [tree nut] Anaphylaxis     No past medical history on file.  No past surgical history on file.  No family history on file.  Social History[1]  Review of Systems   Gastrointestinal:  Positive for abdominal pain.   Genitourinary:  Positive for flank pain and testicular pain. Negative for difficulty urinating.   All other systems reviewed and are negative.      Physical Exam     Initial Vitals [07/12/25 1710]   BP Pulse Resp Temp SpO2   (!) 175/76 63 15 97.6 °F (36.4 °C) 98 %      MAP       --         Physical Exam    Nursing note and vitals reviewed.  Constitutional: He appears well-developed.   Cardiovascular:  Normal rate, regular rhythm and normal heart sounds.           Pulmonary/Chest: Breath sounds normal. No respiratory distress.   Abdominal: Abdomen is soft. He exhibits no distension. There is abdominal tenderness in the right upper quadrant and right lower quadrant.   Right flank pain; right groin   Genitourinary:    Genitourinary Comments: Exam deferred at this time -- denies swelling, redness to testicle        Neurological: He is alert and oriented to person, place, and time.   Skin: Skin is warm and dry.   Psychiatric: He has a normal mood and affect.         ED Course   Procedures  Labs Reviewed   COMPREHENSIVE METABOLIC PANEL - Abnormal       Result Value    Sodium 141      Potassium 4.1      Chloride 105      CO2 28      Glucose 68 (*)     Blood Urea Nitrogen 11.7      Creatinine 1.06      Calcium 9.7      Protein Total 7.3      Albumin 4.4      Globulin 2.9      Albumin/Globulin Ratio 1.5      Bilirubin Total 1.0      ALP 58      ALT 20      AST 16      eGFR >60      Anion Gap  8.0      BUN/Creatinine Ratio 11     MAGNESIUM - Normal    Magnesium Level 2.30     URINALYSIS, REFLEX TO URINE CULTURE - Normal    Color, UA Light-Yellow      Appearance, UA Clear      Specific Gravity, UA <=1.005      pH, UA 6.0      Protein, UA Negative      Glucose, UA Negative      Ketones, UA Negative      Blood, UA Negative      Bilirubin, UA Negative      Urobilinogen, UA 0.2      Nitrites, UA Negative      Leukocyte Esterase, UA Negative      RBC, UA 0-5      WBC, UA None Seen      Bacteria, UA None Seen      Squamous Epithelial Cells, UA None Seen     COVID/RSV/FLU A&B PCR - Normal    Influenza A PCR Not Detected      Influenza B PCR Not Detected      Respiratory Syncytial Virus PCR Not Detected      SARS-CoV-2 PCR Not Detected      Narrative:     The Xpert Xpress SARS-CoV-2/FLU/RSV plus is a rapid, multiplexed real-time PCR test intended for the simultaneous qualitative detection and differentiation of SARS-CoV-2, Influenza A, Influenza B, and respiratory syncytial virus (RSV) viral RNA in either nasopharyngeal swab or nasal swab specimens.         CBC W/ AUTO DIFFERENTIAL    Narrative:     The following orders were created for panel order CBC auto differential.  Procedure                               Abnormality         Status                     ---------                               -----------         ------                     CBC with Differential[[id number]]                           Final result                 Please view results for these tests on the individual orders.   CBC WITH DIFFERENTIAL    WBC 7.41      RBC 5.83      Hgb 16.2      Hct 48.8      MCV 83.7      MCH 27.8      MCHC 33.2      RDW 13.0      Platelet 284      MPV 9.5      Neut % 61.8      Lymph % 29.1      Mono % 6.5      Eos % 1.6      Basophil % 0.9      Imm Grans % 0.1      Neut # 4.57      Lymph # 2.16      Mono # 0.48      Eos # 0.12      Baso # 0.07      Imm Gran # 0.01      NRBC% 0.0            Imaging Results               CT Renal Stone Study ABD Pelvis WO (Final result)  Result time 07/12/25 19:23:05      Final result by Georgi Marcus MD (07/12/25 19:23:05)                   Impression:      1. No renal calculi or acute obstructive uropathy.    2. Noninflamed diverticulosis coli.    3. Details of other findings above.      Electronically signed by: Georgi Marcus  Date:    07/12/2025  Time:    19:23               Narrative:    EXAMINATION:  CT RENAL STONE STUDY ABD PELVIS WO    CLINICAL HISTORY:  Flank pain, kidney stone suspected;    TECHNIQUE:  Multidetector axial images were obtained from the  diaphragms to below symphysis pubis without the administration of IV contrast. Oral contrast was not administered.    Dose length product of 936 mGycm. Automated exposure control was utilized to minimize radiation dose.    COMPARISON:  April 3, 2024.    FINDINGS:  Included lungs show by lungs dependent hypoventilatory changes without acute air space infiltrates or fluid within the pleural spaces.    Within limitations of noncontrast technique, no acute findings of the liver, pancreas and spleen identified. Gallbladder wall is not thickened and there is no intraluminal calcified calculus. No apparent biliary dilation.    The adrenal glands noncontrast evaluation is unremarkable. The kidneys are unremarkable in size and contour. There is no hydronephrosis or nephrolithiasis. The ureters appear normal in course and diameter without intra ureteral stone.    Stomach is entirely decompressed.  No abnormal dilatation of loops of small bowel.  Appendix is unremarkable on image 95 series 2.  Colon is nondistended and there are no pericolonic acute strandings.  There is descending and sigmoid colon diverticulosis coli without pericolonic acute strandings.  No transition or bowel obstruction.  No free fluid.    Urinary bladder wall is not thickened.  No intravesical stone identified. There is no pelvic free fluid.                                        US SCROTUM AND TESTICLES WITH DOPPLER (XPD) (Final result)  Result time 07/12/25 18:08:25   Procedure changed from US Scrotum And Testicles     Final result by Georgi Marcus MD (07/12/25 18:08:25)                   Impression:      No abnormality with sonography identified.      Electronically signed by: Georgi Marcus  Date:    07/12/2025  Time:    18:08               Narrative:    EXAMINATION:  US SCROTUM AND TESTICLES WITH DOPPLER (XPD)    CLINICAL HISTORY:  pain;Right testicular pain    TECHNIQUE:  Multiple real-time images were performed of the scrotum in various planes by the sonographer.    COMPARISON:  None available.    FINDINGS:  Right testicle measures 4.0 x 2.1 x 2.8 cm and the left testicle measures 4.2 x 2.1 x 3.1 cm.  Bilateral unremarkable echotexture of the testicles.  There is bilateral unremarkable arterial and venous flow to the testicles.  Bilateral unremarkable echotexture of the epididymi without increased vascularity.    There is no hydrocele or varicocele.                                       Medications   sodium chloride 0.9% bolus 1,000 mL 1,000 mL (0 mLs Intravenous Stopped 7/12/25 1940)   morphine injection 4 mg (4 mg Intravenous Given 7/12/25 1811)   ondansetron injection 4 mg (4 mg Intravenous Given 7/12/25 1811)     Medical Decision Making  43 y/o male presents with right flank/groin/testicular pain since Wednesday but states he has had off/on discomfort intermittently over the last 3 years since he had a vasectomy. Usually the pain resolves on its own but this is different. The pain moving from right groin area to back and then he had nausea. No urinary complaints. No fever.     Labs, urine neg. US neg for acute findings. CT neg for acute findings. Appendix normal, no stone.     Feeling improved because of morphine but discussed I do not have reason for his discomfort. Encouraged urology and pcp follow up    Amount and/or Complexity of Data Reviewed  Labs:  Decision-making  details documented in ED Course.  Radiology: ordered. Decision-making details documented in ED Course.    Risk  Prescription drug management.      Additional MDM:   Differential Diagnosis:   Other: The following diagnoses were also considered and will be evaluated: renal stone, testicular torsion and uti.                                       Clinical Impression:  Final diagnoses:  [N50.811] Pain in right testicle  [R10.9] Right sided abdominal pain (Primary)  [R10.31] Right groin pain          ED Disposition Condition    Discharge Stable          ED Prescriptions       Medication Sig Dispense Start Date End Date Auth. Provider    ibuprofen (ADVIL,MOTRIN) 800 MG tablet Take 1 tablet (800 mg total) by mouth 3 (three) times daily as needed for Pain. 21 tablet 7/12/2025 7/19/2025 Margo Meraz FNP    HYDROcodone-acetaminophen (NORCO) 5-325 mg per tablet Take 1 tablet by mouth every 8 (eight) hours as needed for Pain. 9 tablet 7/12/2025 7/15/2025 Margo Meraz FNP          Follow-up Information       Follow up With Specialties Details Why Contact Info    Jimi Hernandez MD Internal Medicine Call in 1 week  [address] [place] [address]  [address]  [facility] [address]  [phone]                     [1]         Margo Meraz FNP  07/12/25 8589

## 2025-08-04 ENCOUNTER — HOSPITAL ENCOUNTER (OUTPATIENT)
Dept: RADIOLOGY | Facility: HOSPITAL | Age: 45
Discharge: HOME | End: 2025-08-04
Attending: INTERNAL MEDICINE
Payer: COMMERCIAL

## 2025-08-04 DIAGNOSIS — M54.50 LOW BACK PAIN: ICD-10-CM

## 2025-08-04 PROCEDURE — 72100 X-RAY EXAM L-S SPINE 2/3 VWS: CPT | Mod: TC
